# Patient Record
Sex: MALE | Race: OTHER | Employment: UNEMPLOYED | URBAN - METROPOLITAN AREA
[De-identification: names, ages, dates, MRNs, and addresses within clinical notes are randomized per-mention and may not be internally consistent; named-entity substitution may affect disease eponyms.]

---

## 2017-12-14 ENCOUNTER — HOSPITAL ENCOUNTER (OUTPATIENT)
Dept: LAB | Age: 57
Discharge: HOME | End: 2017-12-14
Payer: OTHER MISCELLANEOUS

## 2017-12-14 LAB
CRP SERPL-MCNC: 0.4 MG/DL (ref 0–0.3)
ERYTHROCYTE [DISTWIDTH] IN BLOOD BY AUTOMATED COUNT: 13.9 % (ref 11.6–14.5)
ERYTHROCYTE [SEDIMENTATION RATE] IN BLOOD: 2 MM/HR (ref 0–20)
HCT VFR BLD AUTO: 45.1 % (ref 36–48)
HGB BLD-MCNC: 14.8 G/DL (ref 13–16)
MCH RBC QN AUTO: 29.1 PG (ref 24–34)
MCHC RBC AUTO-ENTMCNC: 32.8 G/DL (ref 31–37)
MCV RBC AUTO: 88.6 FL (ref 74–97)
PLATELET # BLD AUTO: 239 K/UL (ref 135–420)
PMV BLD AUTO: 10.9 FL (ref 9.2–11.8)
RBC # BLD AUTO: 5.09 M/UL (ref 4.7–5.5)
WBC # BLD AUTO: 5.7 K/UL (ref 4.6–13.2)

## 2017-12-14 PROCEDURE — 86140 C-REACTIVE PROTEIN: CPT | Performed by: ORTHOPAEDIC SURGERY

## 2017-12-14 PROCEDURE — 85027 COMPLETE CBC AUTOMATED: CPT | Performed by: ORTHOPAEDIC SURGERY

## 2017-12-14 PROCEDURE — 85652 RBC SED RATE AUTOMATED: CPT | Performed by: ORTHOPAEDIC SURGERY

## 2019-06-12 ENCOUNTER — HOSPITAL ENCOUNTER (OUTPATIENT)
Age: 59
Discharge: HOME | End: 2019-06-12
Attending: PHYSICIAN ASSISTANT
Payer: OTHER MISCELLANEOUS

## 2019-06-12 DIAGNOSIS — S39.012A STRAIN OF LUMBAR REGION, INITIAL ENCOUNTER: ICD-10-CM

## 2019-06-12 DIAGNOSIS — S33.5XXA SPRAIN OF LUMBAR REGION: ICD-10-CM

## 2019-06-12 PROCEDURE — 72148 MRI LUMBAR SPINE W/O DYE: CPT

## 2019-06-19 ENCOUNTER — OFFICE VISIT (OUTPATIENT)
Dept: ORTHOPEDIC SURGERY | Age: 59
End: 2019-06-19

## 2020-01-08 ENCOUNTER — HOSPITAL ENCOUNTER (INPATIENT)
Age: 60
LOS: 3 days | Discharge: HOME | DRG: 378 | End: 2020-01-11
Attending: EMERGENCY MEDICINE | Admitting: INTERNAL MEDICINE
Payer: COMMERCIAL

## 2020-01-08 DIAGNOSIS — K92.1 GASTROINTESTINAL HEMORRHAGE WITH MELENA: Primary | ICD-10-CM

## 2020-01-08 PROBLEM — K92.2 GI BLEED DUE TO NSAIDS: Status: ACTIVE | Noted: 2020-01-08

## 2020-01-08 PROBLEM — T39.395A GI BLEED DUE TO NSAIDS: Status: ACTIVE | Noted: 2020-01-08

## 2020-01-08 LAB
ABO + RH BLD: NORMAL
ANION GAP SERPL CALC-SCNC: 6 MMOL/L (ref 3–18)
ATRIAL RATE: 88 BPM
BASOPHILS # BLD: 0 K/UL (ref 0–0.1)
BASOPHILS # BLD: 0 K/UL (ref 0–0.1)
BASOPHILS NFR BLD: 0 % (ref 0–2)
BASOPHILS NFR BLD: 0 % (ref 0–2)
BLOOD GROUP ANTIBODIES SERPL: NORMAL
BUN SERPL-MCNC: 18 MG/DL (ref 7–18)
BUN/CREAT SERPL: 18 (ref 12–20)
CALCIUM SERPL-MCNC: 8.8 MG/DL (ref 8.5–10.1)
CALCULATED P AXIS, ECG09: 53 DEGREES
CALCULATED R AXIS, ECG10: -29 DEGREES
CALCULATED T AXIS, ECG11: 51 DEGREES
CHLORIDE SERPL-SCNC: 105 MMOL/L (ref 100–111)
CO2 SERPL-SCNC: 28 MMOL/L (ref 21–32)
CREAT SERPL-MCNC: 0.99 MG/DL (ref 0.6–1.3)
DIAGNOSIS, 93000: NORMAL
DIFFERENTIAL METHOD BLD: ABNORMAL
DIFFERENTIAL METHOD BLD: ABNORMAL
EOSINOPHIL # BLD: 0 K/UL (ref 0–0.4)
EOSINOPHIL # BLD: 0.2 K/UL (ref 0–0.4)
EOSINOPHIL NFR BLD: 0 % (ref 0–5)
EOSINOPHIL NFR BLD: 3 % (ref 0–5)
ERYTHROCYTE [DISTWIDTH] IN BLOOD BY AUTOMATED COUNT: 13.7 % (ref 11.6–14.5)
ERYTHROCYTE [DISTWIDTH] IN BLOOD BY AUTOMATED COUNT: 13.8 % (ref 11.6–14.5)
GLUCOSE SERPL-MCNC: 138 MG/DL (ref 74–99)
HCT VFR BLD AUTO: 36.1 % (ref 36–48)
HCT VFR BLD AUTO: 38.1 % (ref 36–48)
HCT VFR BLD AUTO: 42 % (ref 36–48)
HEMOCCULT STL QL: POSITIVE
HGB BLD-MCNC: 11.2 G/DL (ref 13–16)
HGB BLD-MCNC: 12.3 G/DL (ref 13–16)
HGB BLD-MCNC: 13.4 G/DL (ref 13–16)
LYMPHOCYTES # BLD: 1.5 K/UL (ref 0.9–3.6)
LYMPHOCYTES # BLD: 2.1 K/UL (ref 0.9–3.6)
LYMPHOCYTES NFR BLD: 15 % (ref 21–52)
LYMPHOCYTES NFR BLD: 27 % (ref 21–52)
MCH RBC QN AUTO: 28.4 PG (ref 24–34)
MCH RBC QN AUTO: 29 PG (ref 24–34)
MCHC RBC AUTO-ENTMCNC: 31 G/DL (ref 31–37)
MCHC RBC AUTO-ENTMCNC: 31.9 G/DL (ref 31–37)
MCV RBC AUTO: 90.9 FL (ref 74–97)
MCV RBC AUTO: 91.4 FL (ref 74–97)
MONOCYTES # BLD: 0.6 K/UL (ref 0.05–1.2)
MONOCYTES # BLD: 0.9 K/UL (ref 0.05–1.2)
MONOCYTES NFR BLD: 7 % (ref 3–10)
MONOCYTES NFR BLD: 9 % (ref 3–10)
NEUTS SEG # BLD: 4.9 K/UL (ref 1.8–8)
NEUTS SEG # BLD: 7.4 K/UL (ref 1.8–8)
NEUTS SEG NFR BLD: 63 % (ref 40–73)
NEUTS SEG NFR BLD: 76 % (ref 40–73)
P-R INTERVAL, ECG05: 132 MS
PLATELET # BLD AUTO: 233 K/UL (ref 135–420)
PLATELET # BLD AUTO: 261 K/UL (ref 135–420)
PMV BLD AUTO: 9.2 FL (ref 9.2–11.8)
PMV BLD AUTO: 9.4 FL (ref 9.2–11.8)
POTASSIUM SERPL-SCNC: 4.1 MMOL/L (ref 3.5–5.5)
Q-T INTERVAL, ECG07: 360 MS
QRS DURATION, ECG06: 84 MS
QTC CALCULATION (BEZET), ECG08: 435 MS
RBC # BLD AUTO: 3.95 M/UL (ref 4.7–5.5)
RBC # BLD AUTO: 4.62 M/UL (ref 4.7–5.5)
SODIUM SERPL-SCNC: 139 MMOL/L (ref 136–145)
SPECIMEN EXP DATE BLD: NORMAL
TROPONIN I SERPL-MCNC: <0.02 NG/ML (ref 0–0.04)
VENTRICULAR RATE, ECG03: 88 BPM
WBC # BLD AUTO: 7.8 K/UL (ref 4.6–13.2)
WBC # BLD AUTO: 9.9 K/UL (ref 4.6–13.2)

## 2020-01-08 PROCEDURE — 99285 EMERGENCY DEPT VISIT HI MDM: CPT

## 2020-01-08 PROCEDURE — 80048 BASIC METABOLIC PNL TOTAL CA: CPT

## 2020-01-08 PROCEDURE — 86900 BLOOD TYPING SEROLOGIC ABO: CPT

## 2020-01-08 PROCEDURE — 85025 COMPLETE CBC W/AUTO DIFF WBC: CPT

## 2020-01-08 PROCEDURE — 84484 ASSAY OF TROPONIN QUANT: CPT

## 2020-01-08 PROCEDURE — 93005 ELECTROCARDIOGRAM TRACING: CPT

## 2020-01-08 PROCEDURE — 85018 HEMOGLOBIN: CPT

## 2020-01-08 PROCEDURE — 74011250636 HC RX REV CODE- 250/636: Performed by: EMERGENCY MEDICINE

## 2020-01-08 PROCEDURE — 65660000000 HC RM CCU STEPDOWN

## 2020-01-08 PROCEDURE — 82270 OCCULT BLOOD FECES: CPT

## 2020-01-08 PROCEDURE — C9113 INJ PANTOPRAZOLE SODIUM, VIA: HCPCS | Performed by: EMERGENCY MEDICINE

## 2020-01-08 PROCEDURE — 74011000250 HC RX REV CODE- 250: Performed by: EMERGENCY MEDICINE

## 2020-01-08 RX ORDER — TRAMADOL HYDROCHLORIDE 50 MG/1
50 TABLET ORAL
COMMUNITY

## 2020-01-08 RX ORDER — CYCLOBENZAPRINE HCL 10 MG
10 TABLET ORAL
COMMUNITY

## 2020-01-08 RX ORDER — IBUPROFEN 600 MG/1
600 TABLET ORAL
COMMUNITY
End: 2020-01-11

## 2020-01-08 RX ORDER — SODIUM CHLORIDE 9 MG/ML
125 INJECTION, SOLUTION INTRAVENOUS CONTINUOUS
Status: DISCONTINUED | OUTPATIENT
Start: 2020-01-08 | End: 2020-01-10

## 2020-01-08 RX ORDER — HYDROCODONE BITARTRATE AND ACETAMINOPHEN 10; 325 MG/1; MG/1
1 TABLET ORAL
COMMUNITY

## 2020-01-08 RX ADMIN — SODIUM CHLORIDE 1000 ML: 900 INJECTION, SOLUTION INTRAVENOUS at 11:03

## 2020-01-08 RX ADMIN — SODIUM CHLORIDE 125 ML/HR: 900 INJECTION, SOLUTION INTRAVENOUS at 14:58

## 2020-01-08 RX ADMIN — SODIUM CHLORIDE 1000 ML: 900 INJECTION, SOLUTION INTRAVENOUS at 14:31

## 2020-01-08 RX ADMIN — PANTOPRAZOLE SODIUM 40 MG: 40 INJECTION, POWDER, FOR SOLUTION INTRAVENOUS at 14:54

## 2020-01-08 NOTE — ED TRIAGE NOTES
Pt presents via ems for syncopal episode while at MD's office and GI Bleed. Per EMS syncopal episode lasted less than 30 seconds, pt had large stool incontinence episode at time of syncope that looked to have blood in stool per EMS. Pt denies chest pain, shortness of breath, nausea, abdominal pain. Reports mild lightheadedness. Pt has bandaging on right ankle from previous toe to ankle replacement surgery, surgical site closed and in appropriate condition for healing.

## 2020-01-08 NOTE — ED PROVIDER NOTES
HPI patient presents to the emergency room today after syncopal episode and a bloody bowel movement. He was at the orthopedic clinic next-door to our facility for routine follow-up of ankle surgery. Patient says he felt fine this morning and he felt fine as he was coming in for his appointment. While he was in the orthopedic office he said he stood up and started to feel lightheaded and dizzy. Patient was observed to have a syncopal episode and was helped down to the floor. Once he was down on the floor patient had a large bloody bowel movement. Patient says that he noticed some blood in his bowel movement earlier this morning but not did not think any more about it. Patient woke up after he was on the floor and was transported to the emergency room. On arrival to the ER he is awake alert and oriented with stable vital signs. He denies any abdominal pain nausea or vomiting. He states he has been taking oral ibuprofen regularly for several weeks to treat his ankle pain. He denies any past history of GI bleeding. No other complaints given at this time. Past Medical History:   Diagnosis Date    GERD (gastroesophageal reflux disease)        Past Surgical History:   Procedure Laterality Date    HX ORTHOPAEDIC  11/07/2019    toe to ankle replacement         History reviewed. No pertinent family history.     Social History     Socioeconomic History    Marital status: UNKNOWN     Spouse name: Not on file    Number of children: Not on file    Years of education: Not on file    Highest education level: Not on file   Occupational History    Not on file   Social Needs    Financial resource strain: Not on file    Food insecurity:     Worry: Not on file     Inability: Not on file    Transportation needs:     Medical: Not on file     Non-medical: Not on file   Tobacco Use    Smoking status: Never Smoker   Substance and Sexual Activity    Alcohol use: No    Drug use: No    Sexual activity: Not Currently Lifestyle    Physical activity:     Days per week: Not on file     Minutes per session: Not on file    Stress: Not on file   Relationships    Social connections:     Talks on phone: Not on file     Gets together: Not on file     Attends Episcopalian service: Not on file     Active member of club or organization: Not on file     Attends meetings of clubs or organizations: Not on file     Relationship status: Not on file    Intimate partner violence:     Fear of current or ex partner: Not on file     Emotionally abused: Not on file     Physically abused: Not on file     Forced sexual activity: Not on file   Other Topics Concern    Not on file   Social History Narrative    Not on file         ALLERGIES: Iodine    Review of Systems   Constitutional: Negative. HENT: Negative. Eyes: Negative. Respiratory: Negative. Cardiovascular: Negative. Gastrointestinal: Positive for blood in stool. Genitourinary: Negative. Musculoskeletal: Negative. Neurological: Negative. Psychiatric/Behavioral: Negative. Vitals:    01/08/20 1032   BP: 128/81   Pulse: 89   Resp: 18   Temp: 97.3 °F (36.3 °C)   SpO2: 99%   Weight: 96.6 kg (213 lb)   Height: 5' 7\" (1.702 m)            Physical Exam  Vitals signs and nursing note reviewed. Constitutional:       Appearance: He is well-developed. HENT:      Head: Normocephalic and atraumatic. Eyes:      Pupils: Pupils are equal, round, and reactive to light. Neck:      Musculoskeletal: Neck supple. Cardiovascular:      Rate and Rhythm: Normal rate and regular rhythm. Pulmonary:      Effort: Pulmonary effort is normal.      Breath sounds: Normal breath sounds. Abdominal:      Palpations: Abdomen is soft. Genitourinary:     Rectum: Guaiac result positive. Comments: Rectal exam shows normal rectal tone with no hemorrhoids. There is is there is bright red blood residue in the rectal vault. No active bleeding at this time.   Musculoskeletal: Normal range of motion. Skin:     General: Skin is warm and dry. Neurological:      Mental Status: He is alert and oriented to person, place, and time. MDM  Number of Diagnoses or Management Options  Gastrointestinal hemorrhage with melena:          Procedures        On patient initial presentation to the emergency department he was alert and oriented with stable vital signs. His ER work-up is documented above. Patient was observed in the emergency room for several hours after his initial presentation. He remained asymptomatic with stable vital signs for approximately 3 hours. At about 3-1/2-hour paolo. Patient had a another episode of a bloody bowel movement with hypotension and near syncope. This was treated with normal saline bolus and patient promptly returned to stable vital signs and normal mental status. At this point I discussed with the patient and his wife that I felt the best course of action with hospital admission for further evaluation and treatment. They understand and agree with this plan and we will move forward accordingly.   Tyler Butt MD 2:44 PM

## 2020-01-08 NOTE — ED NOTES
Patient's wife to nurses station, states patient needs to use restroom. When entered room, patient stated he needed to \"sit on the stool\" when asked if he felt ok walking, pt stated \"I think I need a wheelchair\". When arrived back to room with chair, pt sitting up on stretcher leaning over on wife and eyes rolling back. Assisted back to bed and Dr. Gui Vasquez called to room. Pt placed on bed pan.

## 2020-01-09 PROBLEM — M19.079 ANKLE ARTHRITIS: Status: ACTIVE | Noted: 2019-11-06

## 2020-01-09 LAB
BASOPHILS # BLD: 0 K/UL (ref 0–0.1)
BASOPHILS NFR BLD: 0 % (ref 0–2)
DIFFERENTIAL METHOD BLD: ABNORMAL
EOSINOPHIL # BLD: 0.2 K/UL (ref 0–0.4)
EOSINOPHIL NFR BLD: 2 % (ref 0–5)
ERYTHROCYTE [DISTWIDTH] IN BLOOD BY AUTOMATED COUNT: 13.9 % (ref 11.6–14.5)
HCT VFR BLD AUTO: 32.1 % (ref 36–48)
HCT VFR BLD AUTO: 35.6 % (ref 36–48)
HCT VFR BLD AUTO: 35.7 % (ref 36–48)
HGB BLD-MCNC: 10.5 G/DL (ref 13–16)
HGB BLD-MCNC: 11.2 G/DL (ref 13–16)
HGB BLD-MCNC: 11.4 G/DL (ref 13–16)
LYMPHOCYTES # BLD: 1.9 K/UL (ref 0.9–3.6)
LYMPHOCYTES NFR BLD: 24 % (ref 21–52)
MCH RBC QN AUTO: 28.9 PG (ref 24–34)
MCHC RBC AUTO-ENTMCNC: 31.5 G/DL (ref 31–37)
MCV RBC AUTO: 91.8 FL (ref 74–97)
MONOCYTES # BLD: 0.7 K/UL (ref 0.05–1.2)
MONOCYTES NFR BLD: 9 % (ref 3–10)
NEUTS SEG # BLD: 5 K/UL (ref 1.8–8)
NEUTS SEG NFR BLD: 65 % (ref 40–73)
PLATELET # BLD AUTO: 229 K/UL (ref 135–420)
PMV BLD AUTO: 9.8 FL (ref 9.2–11.8)
RBC # BLD AUTO: 3.88 M/UL (ref 4.7–5.5)
WBC # BLD AUTO: 7.9 K/UL (ref 4.6–13.2)

## 2020-01-09 PROCEDURE — 74011250637 HC RX REV CODE- 250/637: Performed by: NURSE PRACTITIONER

## 2020-01-09 PROCEDURE — 85018 HEMOGLOBIN: CPT

## 2020-01-09 PROCEDURE — 74011250636 HC RX REV CODE- 250/636: Performed by: NURSE PRACTITIONER

## 2020-01-09 PROCEDURE — 85025 COMPLETE CBC W/AUTO DIFF WBC: CPT

## 2020-01-09 PROCEDURE — 65660000000 HC RM CCU STEPDOWN

## 2020-01-09 PROCEDURE — C9113 INJ PANTOPRAZOLE SODIUM, VIA: HCPCS | Performed by: NURSE PRACTITIONER

## 2020-01-09 PROCEDURE — 74011250636 HC RX REV CODE- 250/636: Performed by: EMERGENCY MEDICINE

## 2020-01-09 PROCEDURE — 36415 COLL VENOUS BLD VENIPUNCTURE: CPT

## 2020-01-09 RX ORDER — ONDANSETRON 2 MG/ML
4 INJECTION INTRAMUSCULAR; INTRAVENOUS
Status: DISCONTINUED | OUTPATIENT
Start: 2020-01-09 | End: 2020-01-11 | Stop reason: HOSPADM

## 2020-01-09 RX ORDER — PANTOPRAZOLE SODIUM 40 MG/10ML
40 INJECTION, POWDER, LYOPHILIZED, FOR SOLUTION INTRAVENOUS EVERY 12 HOURS
Status: DISCONTINUED | OUTPATIENT
Start: 2020-01-09 | End: 2020-01-10

## 2020-01-09 RX ORDER — HYDROCODONE BITARTRATE AND ACETAMINOPHEN 10; 325 MG/1; MG/1
1 TABLET ORAL
Status: DISCONTINUED | OUTPATIENT
Start: 2020-01-09 | End: 2020-01-11 | Stop reason: HOSPADM

## 2020-01-09 RX ORDER — ACETAMINOPHEN 325 MG/1
650 TABLET ORAL
Status: DISCONTINUED | OUTPATIENT
Start: 2020-01-09 | End: 2020-01-11 | Stop reason: HOSPADM

## 2020-01-09 RX ADMIN — HYDROCODONE BITARTRATE AND ACETAMINOPHEN 1 TABLET: 10; 325 TABLET ORAL at 18:34

## 2020-01-09 RX ADMIN — SODIUM CHLORIDE 125 ML/HR: 900 INJECTION, SOLUTION INTRAVENOUS at 16:23

## 2020-01-09 RX ADMIN — PANTOPRAZOLE SODIUM 40 MG: 40 INJECTION, POWDER, LYOPHILIZED, FOR SOLUTION INTRAVENOUS at 16:23

## 2020-01-09 RX ADMIN — PANTOPRAZOLE SODIUM 40 MG: 40 INJECTION, POWDER, LYOPHILIZED, FOR SOLUTION INTRAVENOUS at 20:57

## 2020-01-09 RX ADMIN — SODIUM CHLORIDE 125 ML/HR: 900 INJECTION, SOLUTION INTRAVENOUS at 13:19

## 2020-01-09 RX ADMIN — SODIUM CHLORIDE 125 ML/HR: 900 INJECTION, SOLUTION INTRAVENOUS at 00:11

## 2020-01-09 NOTE — ROUTINE PROCESS
TRANSFER - OUT REPORT:    Verbal report given to Al Grewal RN on Elieser Jack  being transferred to [address] [place] [address] for routine progression of care       Report consisted of patients Situation, Background, Assessment and   Recommendations(SBAR). Information from the following report(s) ED Summary, MAR and Cardiac Rhythm NSR was reviewed with the receiving nurse. Lines:   Peripheral IV 01/08/20 Left Antecubital (Active)   Site Assessment Clean, dry, & intact 1/8/2020 10:30 AM   Phlebitis Assessment 0 1/8/2020 10:30 AM   Infiltration Assessment 0 1/8/2020 10:30 AM   Dressing Status Clean, dry, & intact 1/8/2020 10:30 AM   Dressing Type Transparent 1/8/2020 10:30 AM       Peripheral IV 01/08/20 Right Antecubital (Active)   Site Assessment Clean, dry, & intact 1/8/2020 10:55 AM   Phlebitis Assessment 0 1/8/2020 10:55 AM   Infiltration Assessment 0 1/8/2020 10:55 AM   Dressing Status Clean, dry, & intact 1/8/2020 10:55 AM   Dressing Type Tape;Transparent 1/8/2020 10:55 AM   Hub Color/Line Status Pink;Flushed 1/8/2020 10:55 AM   Action Taken Blood drawn 1/8/2020 10:55 AM        Opportunity for questions and clarification was provided.       Patient transported with:   Monitor

## 2020-01-09 NOTE — ROUTINE PROCESS
TRANSFER - IN REPORT:    Verbal report received from Samaria Crook on [facility]  being received from  [facility] for routine progression of care      Report consisted of patients Situation, Background, Assessment and   Recommendations(SBAR). Information from the following report(s) SBAR, Kardex, ED Summary, Procedure Summary, Intake/Output, MAR, Recent Results and Med Rec Status was reviewed with the receiving nurse. Opportunity for questions and clarification was provided. Assessment completed upon patients arrival to unit and care assumed.      Primary Nurse Lory Shanks RN and CHRISTO landers performed a dual skin assessment on this patient No impairment noted  Cullen score is 23            \

## 2020-01-09 NOTE — ED NOTES
[facility] to Bedside for transport to [facility] for admission.

## 2020-01-09 NOTE — PROGRESS NOTES
conducted an Spirituality Group for Tarry Bence, who is a 61 y.o.,male. Patient's Primary Language is: [place]. According to the patient's EMR Pentecostal Affiliation is: Other. The reason the Patient came to the hospital is:   Patient Active Problem List    Diagnosis Date Noted    GI bleed due to NSAIDs 01/08/2020         conducted Spirituality Group for ________________ who was one of ____participants. The  provided the following Interventions:  Continued the relationship of care and support. Listened empathically. Offered prayer and assurance of continued prayer on patients behalf. Chart reviewed. The following outcomes were achieved:  Patient expressed gratitude for 's visit.  w  Assessment:  There are no further spiritual or Mormonism issues which require Spiritual Care Services interventions at this time. Plan:  Chaplains will continue to follow and will provide pastoral care on an as needed/requested basis.  recommends bedside caregivers page  on duty if patient shows signs of acute spiritual or emotional distress.        [address] [place] [address]   [phone]

## 2020-01-09 NOTE — CONSULTS
[email or web address]  [phone]    GASTROENTEROLOGY CONSULT      Impression:   1. Hematochezia/melena: suspect upper GI source- likely ulcerative disease given his frequent use of NSAIDs. Will plan for EGD in AM  2. Acute mild anemia; baseline hgb 13; now Hgb 11.2. Suspect H/H may drift a little lower. 3. Syncopal episode; likely caused by hematochezia  4. Diverticulosis; noted on previous colonoscopy ~5 years ago; due for screening this year  5. NSAID use; daily use of ibuprofen- 600mg \"a lot\" during the day for LLE pain        Plan:     1. Can have full liquid diet tonight, NPO after MN for EGD in AM  2. EGD tomorrow- added on to endo schedule  3. Monitor H/H and transfuse per protocol  4. Continue BID PPI   5. Medical management per primary team    Chief Complaint: GI bleeding      HPI:  Joel Rodrigues is a 61 y.o. male who I am being asked to see in consultation for an opinion regarding the above. Patient reports doing well this AM but went to ortho clinic and felt dizzy and \"off\"   He states he must have tried to stand up and then passed out as he found his glasses and cell phone on the floor and people were asking if he was ok. He was noted to have had a large bloody BM in the chair he was sitting in. He was assisted to bed and then was noted to be going to the ER- he assumes he passed out again. He reports seeing a small amount of blood yesterday in a BM but was unconcerned. States the blood he noted today was dark red- almost black. He denies abdominal pain, heartburn/dysphagia, changes in appetite or BMs. Reports using ibuprofen 600mg several times daily for LLE pain. States last colonoscopy was 5 years ago; just received letter he is due for one this year. No previous GI bleeding  No family h/o colon cancer/GI malignancies.      PMH:   Past Medical History:   Diagnosis Date    Diverticulitis     GERD (gastroesophageal reflux disease)     Kidney stones        PSH:   Past Surgical History:   Procedure Laterality Date    HX ORTHOPAEDIC  11/07/2019    toe to ankle replacement       Social HX:   Social History     Socioeconomic History    Marital status: UNKNOWN     Spouse name: Not on file    Number of children: Not on file    Years of education: Not on file    Highest education level: Not on file   Occupational History    Not on file   Social Needs    Financial resource strain: Not on file    Food insecurity:     Worry: Not on file     Inability: Not on file    Transportation needs:     Medical: Not on file     Non-medical: Not on file   Tobacco Use    Smoking status: Never Smoker   Substance and Sexual Activity    Alcohol use: No    Drug use: No    Sexual activity: Not Currently   Lifestyle    Physical activity:     Days per week: Not on file     Minutes per session: Not on file    Stress: Not on file   Relationships    Social connections:     Talks on phone: Not on file     Gets together: Not on file     Attends Nondenominational service: Not on file     Active member of club or organization: Not on file     Attends meetings of clubs or organizations: Not on file     Relationship status: Not on file    Intimate partner violence:     Fear of current or ex partner: Not on file     Emotionally abused: Not on file     Physically abused: Not on file     Forced sexual activity: Not on file   Other Topics Concern    Not on file   Social History Narrative    Not on file       FHX:   History reviewed. No pertinent family history. Allergy:   Allergies   Allergen Reactions    Iodine Itching       Patient Active Problem List   Diagnosis Code    GI bleed due to NSAIDs K92.2, T39.395A       Home Medications:     Medications Prior to Admission   Medication Sig    cyclobenzaprine (FLEXERIL) 10 mg tablet Take 10 mg by mouth three (3) times daily as needed for Muscle Spasm(s).  HYDROcodone-acetaminophen (NORCO)  mg tablet Take 1 Tab by mouth every six (6) hours as needed for Pain.  traMADol (ULTRAM) 50 mg tablet Take 50 mg by mouth every six (6) hours as needed for Pain.  ibuprofen (MOTRIN) 600 mg tablet Take 600 mg by mouth every six (6) hours as needed for Pain.  esomeprazole (NEXIUM) 20 mg capsule Take 20 mg by mouth daily. Review of Systems:     Constitutional: No fevers, chills, weight loss, fatigue. Skin: No rashes, pruritis, jaundice, ulcerations, erythema. HENT: No headaches, nosebleeds, sinus pressure, rhinorrhea, sore throat. Eyes: No visual changes, blurred vision, eye pain, photophobia, jaundice. Cardiovascular: No chest pain, heart palpitations. Respiratory: No cough, SOB, wheezing, chest discomfort, orthopnea. Gastrointestinal: See HPI   Genitourinary: No dysuria, bleeding, discharge, pyuria. Musculoskeletal: No weakness, arthralgias, wasting. Endo: No sweats. Heme: No bruising, easy bleeding. Allergies: As noted. Neurological: Cranial nerves intact. Alert and oriented. Gait not assessed. Psychiatric:  No anxiety, depression, hallucinations. Visit Vitals  BP (!) 128/92   Pulse 85   Temp 98.4 °F (36.9 °C)   Resp 19   Ht 5' 7\" (1.702 m)   Wt 96.6 kg (213 lb)   SpO2 100%   BMI 33.36 kg/m²       Physical Assessment:     constitutional: appearance: well developed, well nourished, normal habitus, no deformities, in no acute distress. skin: inspection: no rashes, ulcers, icterus or other lesions; no clubbing or telangiectasias. palpation: no induration or subcutaneos nodules. eyes: inspection: normal conjunctivae and lids; no jaundice pupils: normal  ENMT: mouth: normal oral mucosa,lips and gums; good dentition. oropharynx: normal tongue, hard and soft palate; posterior pharynx without erithema, exudate or lesions. neck: thyroid: normal size, consistency and position; no masses or tenderness. respiratory: effort: normal chest excursion; no intercostal retraction or accessory muscle use.    cardiovascular: abdominal aorta: normal size and position; no bruits. palpation: PMI of normal size and position; normal rhythm; no thrill or murmurs. abdominal: abdomen: normal consistency; no tenderness or masses. hernias: no hernias appreciated. liver: normal size and consistency. spleen: not palpable. rectal: hemoccult/guaiac: not performed. musculoskeletal: digits and nails: no clubbing, cyanosis, petechiae or other inflammatory conditions. gait: not evaluated head and neck: normal range of motion; no pain, crepitation or contracture. spine/ribs/pelvis: normal range of motion; no pain, deformity or contracture. neurologic: cranial nerves: II-XII normal.   psychiatric: judgement/insight: within normal limits. memory: within normal limits for recent and remote events. mood and affect: no evidence of depression, anxiety or agitation. orientation: oriented to time, space and person. Basic Metabolic Profile   Recent Labs     01/08/20  1055      K 4.1      CO2 28   BUN 18   *   CA 8.8         CBC w/Diff    Recent Labs     01/09/20  0555   WBC 7.9   RBC 3.88*   HGB 11.2*   HCT 35.6*   MCV 91.8   MCH 28.9   MCHC 31.5   RDW 13.9       Recent Labs     01/09/20  0555   GRANS 65   LYMPH 24   EOS 2        Hepatic Function   No results for input(s): ALB, TP, TBILI, GPT, SGOT, AP, AML, LPSE in the last 72 hours. No lab exists for component: DBILI     Coags   No results for input(s): PTP, INR, APTT, INREXT in the last 72 hours. Terry Gonzalez NP. [facility] [address]  Cell: [phone]  [email or web address]

## 2020-01-09 NOTE — H&P
Hospitalist Admission History and Physical    NAME:  Lena Ford   :   1960   MRN:   [id number]     PCP:  Argenis Bronson MD  Date/Time:  2020 2:48 PM    Subjective:   CHIEF COMPLAINT:  GI bleeding     HISTORY OF PRESENT ILLNESS:     Royal Norton is a 61 y.o.  male who presents to [facility] via [address] ED from his orthopedic office after having 2 episodes of syncope and episode of dark red blood per rectum at ortho office. Patient also reports one additional episode of dark red blood yesterday morning prior to going to his orthopedic appointment and did have bloody BM earlier today while at [address]. Patient reports he has been taking 2 to 3 tablets of 600 mg ibuprofen for the past 2 to 3 years since sustaining work injury and resultant back / right leg pain. Patient also had right ankle arthroplasty in 2019 for which she was on adult aspirin post surgery but not on actively. Patient denies any other symptoms including no abdominal pain, nausea / vomiting, no shortness of breath, cough, chest pain. Patient denies any current dizziness or episodes of lightheadedness/syncope prior to yesterday. No new pain complaints since the syncopal episodes. Patient does endorse history of small volume bright red blood per rectum in the past. Patient does report history of GERD, past issues with diverticulitis and states he had a colonoscopy approximately 5 years ago    Past Medical History:   Diagnosis Date    Diverticulitis     GERD (gastroesophageal reflux disease)     Kidney stones         Past Surgical History:   Procedure Laterality Date    HX ORTHOPAEDIC  2019    toe to ankle replacement       Social History     Tobacco Use    Smoking status: Never Smoker   Substance Use Topics    Alcohol use: No        History reviewed. No pertinent family history.      Allergies   Allergen Reactions    Iodine Itching        Prior to Admission Medications   Prescriptions Last Dose Informant Patient Reported? Taking? HYDROcodone-acetaminophen (NORCO)  mg tablet 2020 at Unknown time  Yes Yes   Sig: Take 1 Tab by mouth every six (6) hours as needed for Pain. cyclobenzaprine (FLEXERIL) 10 mg tablet 2020 at Unknown time  Yes Yes   Sig: Take 10 mg by mouth three (3) times daily as needed for Muscle Spasm(s). esomeprazole (NEXIUM) 20 mg capsule 2020 at Unknown time  Yes Yes   Sig: Take 20 mg by mouth daily. ibuprofen (MOTRIN) 600 mg tablet 2020 at Unknown time  Yes Yes   Sig: Take 600 mg by mouth every six (6) hours as needed for Pain.   traMADol (ULTRAM) 50 mg tablet 2020 at Unknown time  Yes Yes   Sig: Take 50 mg by mouth every six (6) hours as needed for Pain. Facility-Administered Medications: None       REVIEW OF SYSTEMS:     [] Unable to obtain  ROS due to  []mental status change  []sedated   []intubated   [x]Total of 12 systems reviewed as follows:    GENERAL: hungry, no other complaints  HEENT: no sinus congestion / hearing or vision changes  NECK: No pain or stiffness. PULMONARY: no shortness of breath or cough  Cardiovascular: denies palpitations; no dizziness  GASTROINTESTINAL: Denies abdominal pain, constipation, diarrhea, nausea, vomiting; + dark red blood per rectum earlier today  GENITOURINARY: No urinary frequency, urgency, hesitancy or dysuria. MUSCULOSKELETAL: Chronic back, right knee and right ankle pain  DERMATOLOGIC: denies pruritis, rashes or open areas   ENDOCRINE: No polyuria, polydipsia, no heat or cold intolerance. HEMATOLOGICAL: No anemia or easy bruising or bleeding.    NEUROLOGIC:  no disorientation or tremor    Objective:   VITALS:    Visit Vitals  BP (!) 128/92   Pulse 85   Temp 98.4 °F (36.9 °C)   Resp 19   Ht 5' 7\" (1.702 m)   Wt 96.6 kg (213 lb)   SpO2 100%   BMI 33.36 kg/m²     Temp (24hrs), Av.5 °F (36.9 °C), Min:98.4 °F (36.9 °C), Max:98.6 °F (37 °C)      PHYSICAL EXAM:   General:          Alert, in NAD  HEENT: Pupils equal.  Sclera anicteric. Conjunctiva pink. Mucous membranes moist  Neck:               Supple. Trachea midline. No accessory muscle use. CV:                  Regular rate and rhythm. S1S2+  Lungs:             Clear to auscultation bilaterally. No Wheezing or Rhonchi. No rales. Abdomen:        Soft, non-tender. Not distended. Bowel sounds normal.   Extremities:     No cyanosis. 1-2+ right ankle swelling and tenderness at site of prior surgical intervention edema  Neurologic:      Alert and oriented X 4. Follows commands, responds appropriately. No focal neurological deficit was noted  Skin:                Warm and dry. No rashes. LAB DATA REVIEWED:    No components found for: [place]  Recent Labs     01/09/20  0555 01/08/20  1945 01/08/20  1430 01/08/20  1055   NA  --   --   --  139   K  --   --   --  4.1   CL  --   --   --  105   CO2  --   --   --  28   BUN  --   --   --  18   CREA  --   --   --  0.99   GLU  --   --   --  138*   CA  --   --   --  8.8   WBC 7.9  --  9.9 7.8   HGB 11.2* 12.3* 11.2* 13.4   HCT 35.6* 38.1 36.1 42.0     --  233 261     Assessment/Plan:      Active Problems:    GI bleed due to NSAIDs (1/8/2020)     ___________________________________________________  PLAN:    1. GI bleed - GI following, NPO after MN in anticipation of GI intervention; cont PPI; follow H/H  2. Syncopal episodes - likely in setting of GI losses; cardiac monitoring, check H/H; cont IVF; check Echo  3. OA right ankle - s/p right total ankle arthroplasty at [address] in 11/07/2019; pain control, discussed avoidance of NSAIDs   4. GERD - cont PPI  5. H/o diverticulitis  6.  Pure hyperlipidemia - dietary changes recommended per last PCP visit; consideration for statin per PCP at time of follow up if remains elevated    Risk of deterioration:  [x]Low    []Moderate  []High    Prophylaxis:  []Lovenox  []Coumadin  [x]Hep SQ  []SCDs  []H2B/PPI    Disposition:  [x]Home w/ Family   []HH PT,OT,RN []SNF/LTC   []SAH/Rehab    Discussed Code Status:    [x]Full Code      []DNR     ___________________________________________________    Care Plan discussed with:    [x]Patient   [x]Family    []ED Care Manager  []ED Doc   [x]Specialist : Gastroenterology ___________________________________________________  Admitting Provider: Gale Cano NP

## 2020-01-10 ENCOUNTER — ANESTHESIA EVENT (OUTPATIENT)
Dept: ENDOSCOPY | Age: 60
DRG: 378 | End: 2020-01-10
Payer: COMMERCIAL

## 2020-01-10 ENCOUNTER — ANESTHESIA (OUTPATIENT)
Dept: ENDOSCOPY | Age: 60
DRG: 378 | End: 2020-01-10
Payer: COMMERCIAL

## 2020-01-10 ENCOUNTER — APPOINTMENT (OUTPATIENT)
Dept: NON INVASIVE DIAGNOSTICS | Age: 60
DRG: 378 | End: 2020-01-10
Attending: NURSE PRACTITIONER
Payer: COMMERCIAL

## 2020-01-10 PROBLEM — K92.2 GI BLEED: Status: ACTIVE | Noted: 2020-01-10

## 2020-01-10 LAB
ECHO AO ROOT DIAM: 3.52 CM
ECHO LA AREA 4C: 14.5 CM2
ECHO LA VOL 2C: 57.23 ML (ref 18–58)
ECHO LA VOL 4C: 30.61 ML (ref 18–58)
ECHO LA VOL BP: 46.09 ML (ref 18–58)
ECHO LA VOL/BSA BIPLANE: 22.19 ML/M2 (ref 16–28)
ECHO LA VOLUME INDEX A2C: 27.56 ML/M2 (ref 16–28)
ECHO LA VOLUME INDEX A4C: 14.74 ML/M2 (ref 16–28)
ECHO LV EDV TEICHHOLZ: 0.5 ML
ECHO LV ESV TEICHHOLZ: 0.17 ML
ECHO LV INTERNAL DIMENSION DIASTOLIC: 4.33 CM (ref 4.2–5.9)
ECHO LV INTERNAL DIMENSION SYSTOLIC: 2.77 CM
ECHO LV IVSD: 1.15 CM (ref 0.6–1)
ECHO LV MASS 2D: 219.7 G (ref 88–224)
ECHO LV MASS INDEX 2D: 105.8 G/M2 (ref 49–115)
ECHO LV POSTERIOR WALL DIASTOLIC: 1.25 CM (ref 0.6–1)
ECHO LVOT DIAM: 1.82 CM
ECHO LVOT PEAK GRADIENT: 3.8 MMHG
ECHO LVOT PEAK VELOCITY: 97 CM/S
ECHO LVOT VTI: 19.08 CM
ECHO MV A VELOCITY: 82.36 CM/S
ECHO MV E DECELERATION TIME (DT): 212.4 MS
ECHO MV E VELOCITY: 78.21 CM/S
ECHO MV E/A RATIO: 0.95
ECHO PVEIN A DURATION: 117.6 MS
ECHO PVEIN A VELOCITY: 25.02 CM/S
ECHO RV INTERNAL DIMENSION: 3.62 CM
HCT VFR BLD AUTO: 35.3 % (ref 36–48)
HGB BLD-MCNC: 11.3 G/DL (ref 13–16)
LVFS 2D: 36.15 %
LVOT MG: 2.34 MMHG
LVOT MV: 0.73 CM/S
LVSV (TEICH): 26.17 ML
MV DEC SLOPE: 3.68

## 2020-01-10 PROCEDURE — 76040000019: Performed by: INTERNAL MEDICINE

## 2020-01-10 PROCEDURE — 77030008565 HC TBNG SUC IRR ERBE -B: Performed by: INTERNAL MEDICINE

## 2020-01-10 PROCEDURE — 74011250636 HC RX REV CODE- 250/636: Performed by: FAMILY MEDICINE

## 2020-01-10 PROCEDURE — C9113 INJ PANTOPRAZOLE SODIUM, VIA: HCPCS | Performed by: FAMILY MEDICINE

## 2020-01-10 PROCEDURE — 74011250636 HC RX REV CODE- 250/636: Performed by: NURSE PRACTITIONER

## 2020-01-10 PROCEDURE — 85018 HEMOGLOBIN: CPT

## 2020-01-10 PROCEDURE — 74011000250 HC RX REV CODE- 250: Performed by: FAMILY MEDICINE

## 2020-01-10 PROCEDURE — 93306 TTE W/DOPPLER COMPLETE: CPT

## 2020-01-10 PROCEDURE — 36415 COLL VENOUS BLD VENIPUNCTURE: CPT

## 2020-01-10 PROCEDURE — 74011250637 HC RX REV CODE- 250/637: Performed by: NURSE PRACTITIONER

## 2020-01-10 PROCEDURE — 0DJ08ZZ INSPECTION OF UPPER INTESTINAL TRACT, VIA NATURAL OR ARTIFICIAL OPENING ENDOSCOPIC: ICD-10-PCS | Performed by: INTERNAL MEDICINE

## 2020-01-10 PROCEDURE — C9113 INJ PANTOPRAZOLE SODIUM, VIA: HCPCS | Performed by: NURSE PRACTITIONER

## 2020-01-10 PROCEDURE — 65660000000 HC RM CCU STEPDOWN

## 2020-01-10 PROCEDURE — 74011000250 HC RX REV CODE- 250: Performed by: ANESTHESIOLOGY

## 2020-01-10 PROCEDURE — 77030018846 HC SOL IRR STRL H20 ICUM -A: Performed by: INTERNAL MEDICINE

## 2020-01-10 PROCEDURE — 76060000031 HC ANESTHESIA FIRST 0.5 HR: Performed by: INTERNAL MEDICINE

## 2020-01-10 RX ORDER — LIDOCAINE HYDROCHLORIDE 20 MG/ML
INJECTION, SOLUTION EPIDURAL; INFILTRATION; INTRACAUDAL; PERINEURAL AS NEEDED
Status: DISCONTINUED | OUTPATIENT
Start: 2020-01-10 | End: 2020-01-10 | Stop reason: HOSPADM

## 2020-01-10 RX ORDER — DEXTROSE 50 % IN WATER (D50W) INTRAVENOUS SYRINGE
25-50 AS NEEDED
Status: DISCONTINUED | OUTPATIENT
Start: 2020-01-10 | End: 2020-01-10 | Stop reason: HOSPADM

## 2020-01-10 RX ORDER — INSULIN LISPRO 100 [IU]/ML
INJECTION, SOLUTION INTRAVENOUS; SUBCUTANEOUS ONCE
Status: DISCONTINUED | OUTPATIENT
Start: 2020-01-10 | End: 2020-01-10 | Stop reason: HOSPADM

## 2020-01-10 RX ORDER — MAGNESIUM SULFATE 100 %
4 CRYSTALS MISCELLANEOUS AS NEEDED
Status: DISCONTINUED | OUTPATIENT
Start: 2020-01-10 | End: 2020-01-10 | Stop reason: HOSPADM

## 2020-01-10 RX ORDER — SODIUM CHLORIDE 0.9 % (FLUSH) 0.9 %
5-40 SYRINGE (ML) INJECTION EVERY 8 HOURS
Status: DISCONTINUED | OUTPATIENT
Start: 2020-01-10 | End: 2020-01-10 | Stop reason: HOSPADM

## 2020-01-10 RX ORDER — SODIUM CHLORIDE 0.9 % (FLUSH) 0.9 %
5-40 SYRINGE (ML) INJECTION AS NEEDED
Status: DISCONTINUED | OUTPATIENT
Start: 2020-01-10 | End: 2020-01-10 | Stop reason: HOSPADM

## 2020-01-10 RX ADMIN — PANTOPRAZOLE SODIUM 40 MG: 40 INJECTION, POWDER, LYOPHILIZED, FOR SOLUTION INTRAVENOUS at 08:00

## 2020-01-10 RX ADMIN — SODIUM CHLORIDE 40 MG: 9 INJECTION, SOLUTION INTRAMUSCULAR; INTRAVENOUS; SUBCUTANEOUS at 20:39

## 2020-01-10 RX ADMIN — HYDROCODONE BITARTRATE AND ACETAMINOPHEN 1 TABLET: 10; 325 TABLET ORAL at 07:40

## 2020-01-10 RX ADMIN — LIDOCAINE HYDROCHLORIDE 80 MG: 20 INJECTION, SOLUTION EPIDURAL; INFILTRATION; INTRACAUDAL; PERINEURAL at 08:30

## 2020-01-10 NOTE — PROGRESS NOTES
TRANSFER - IN REPORT:    Verbal report received from [facility]) on Kleber Miss  being received from [address] [place] [address](unit) for ordered procedure      Report consisted of patients Situation, Background, Assessment and   Recommendations(SBAR). Information from the following report(s) SBAR, Kardex, [facility] and Recent Results was reviewed with the receiving nurse. Opportunity for questions and clarification was provided. Assessment completed upon patients arrival to unit and care assumed.

## 2020-01-10 NOTE — CONSULTS
[facility] - Consult Note    Consultation request by Shanta Benjamin MD for advice/opinion related to evaluating GI bleed due to NSAIDs [K92.2, [id number]    Date of  Admission: 1/8/2020 10:30 AM   Primary Care Physician:  Yuliana Cooper MD     Assessment:     Patient Active Problem List   Diagnosis Code    GI bleed due to NSAIDs K92.2, T39.395A    Ankle arthritis M19.079    Esophageal reflux K21.9    Pure hypercholesterolemia E78.00    GI bleed K92.2       -Syncope, suspect vagal/dehydration in setting of GIB.  -GIB, bloody BM prior to admission. EGD today without source of bleeding. Plan for likely outpatient colonoscopy.  -Acute blood loss anemia with Hgb down to 10 not requiring transfusion. -H/o GERD.  -H/o diverticulitis. -H/o foot/ankle surgery. No primary cardiologist.       Plan:     Continue telemetry monitoring. Recheck orthostatics after IV hydration. Continue to follow Hgb. Will review echocardiogram once complete. History of Present Illness: This is a 61 y.o. male admitted for GI bleed due to NSAIDs [K92.2, T39.395A]. Patient complains of:  Syncope. Patient is a 61year old male without significant past cardiac history. Patient does report recent BRBPR. Patient was at doctors office he felt dizzy when standing. Patient passed out. Events were foggy. Patient was noted to have incontinence with bloody BM. Patient denies any Cp, palp, sob, orthopnea, PND, KENIA. Patient denies previous syncope. Cardiac risk factors: none. Review of Symptoms:  Except as stated above include:  Constitutional:  negative  Respiratory:  negative  Cardiovascular:  C/o syncope. Patient denies Cp, palp, SOB.   Gastrointestinal: negative  Genitourinary:  negative  Musculoskeletal:  Negative  Neurological:  Negative  Dermatological:  Negative  Endocrinological: Negative  Psychological:  Negative       Past Medical History:     Past Medical History:   Diagnosis Date    Diverticulitis     GERD (gastroesophageal reflux disease)     Kidney stones          Social History:     Social History     Socioeconomic History    Marital status: UNKNOWN     Spouse name: Not on file    Number of children: Not on file    Years of education: Not on file    Highest education level: Not on file   Tobacco Use    Smoking status: Never Smoker   Substance and Sexual Activity    Alcohol use: No    Drug use: No    Sexual activity: Not Currently        Family History:   History reviewed. No pertinent family history. Medications: Allergies   Allergen Reactions    Iodine Itching        Current Facility-Administered Medications   Medication Dose Route Frequency    ondansetron (ZOFRAN) injection 4 mg  4 mg IntraVENous Q4H PRN    acetaminophen (TYLENOL) tablet 650 mg  650 mg Oral Q4H PRN    pantoprazole (PROTONIX) injection 40 mg  40 mg IntraVENous Q12H    HYDROcodone-acetaminophen (NORCO)  mg tablet 1 Tab  1 Tab Oral Q6H PRN         Physical Exam:     Visit Vitals  /81   Pulse 91   Temp 98.6 °F (37 °C)   Resp 18   Ht 5' 7\" (1.702 m)   Wt 96.6 kg (213 lb)   SpO2 100%   BMI 33.36 kg/m²     BP Readings from Last 3 Encounters:   01/10/20 133/81   03/08/13 125/76     Pulse Readings from Last 3 Encounters:   01/10/20 91   03/08/13 87     Wt Readings from Last 3 Encounters:   01/08/20 96.6 kg (213 lb)   03/08/13 83.9 kg (185 lb)       General:  alert, cooperative, no distress, appears stated age  Neck:  no JVD  Lungs:  clear to auscultation bilaterally  Heart:  regular rate and rhythm, S1, S2 normal, no murmur, click, rub or gallop  Abdomen:  abdomen is soft without significant tenderness, masses, organomegaly or guarding  Extremities:  extremities normal, atraumatic, no cyanosis or edema  Skin: Warm and dry.  no hyperpigmentation, vitiligo, or suspicious lesions  Neuro: alert, oriented x3, affect appropriate  Psych: non focal     Data Review:     Recent Labs     01/10/20  0707 01/09/20  2233 01/09/20  1626 01/09/20  0555  01/08/20  1430 01/08/20  1055   WBC  --   --   --  7.9  --  9.9 7.8   HGB 11.3* 10.5* 11.4* 11.2*   < > 11.2* 13.4   HCT 35.3* 32.1* 35.7* 35.6*   < > 36.1 42.0   PLT  --   --   --  229  --  233 261    < > = values in this interval not displayed.      Recent Labs     01/08/20  1055      K 4.1      CO2 28   *   BUN 18   CREA 0.99   CA 8.8       Results for orders placed or performed during the hospital encounter of 01/08/20   EKG, 12 LEAD, INITIAL   Result Value Ref Range    Ventricular Rate 88 BPM    Atrial Rate 88 BPM    P-R Interval 132 ms    QRS Duration 84 ms    Q-T Interval 360 ms    QTC Calculation (Bezet) 435 ms    Calculated P Axis 53 degrees    Calculated R Axis -29 degrees    Calculated T Axis 51 degrees    Diagnosis       Normal sinus rhythm with sinus arrhythmia  Normal ECG  When compared with ECG of 08-MAR-2013 11:37,  No significant change was found  Confirmed by John Veronica ([id number]) on 1/8/2020 5:05:34 PM         All Cardiac Markers in the last 24 hours:  No results found for: CPK, CK, CKMMB, CKMB, RCK3, CKMBT, CKNDX, CKND1, DONA, TROPT, TROIQ, JACOB, TROPT, TNIPOC, BNP, BNPP    Last Lipid:  No results found for: CHOL, CHOLX, CHLST, CHOLV, HDL, HDLP, LDL, LDLC, DLDLP, TGLX, TRIGL, TRIGP, CHHD, CHHDX    Signed By: GIFTY Little     January 10, 2020

## 2020-01-10 NOTE — ANESTHESIA POSTPROCEDURE EVALUATION
Procedure(s):  ENDOSCOPY. MAC    Anesthesia Post Evaluation      Multimodal analgesia: multimodal analgesia used between 6 hours prior to anesthesia start to PACU discharge  Patient location during evaluation: bedside  Patient participation: complete - patient participated  Level of consciousness: awake  Pain score: 0  Pain management: adequate  Airway patency: patent  Anesthetic complications: no  Cardiovascular status: stable  Respiratory status: acceptable  Hydration status: acceptable  Post anesthesia nausea and vomiting:  none      Vitals Value Taken Time   /78 1/10/2020  8:52 AM   Temp 37 °C (98.6 °F) 1/10/2020  8:44 AM   Pulse 93 1/10/2020  8:53 AM   Resp 27 1/10/2020  8:53 AM   SpO2 100 % 1/10/2020  8:53 AM   Vitals shown include unvalidated device data.

## 2020-01-10 NOTE — PROCEDURES
[email or web address]  [facility], [address]     Endoscopic Gastroduodenoscopy Procedure Note    Antony White  1960  [id number]    Indication:  Melena/hematochezia     : Jeffie Phalen, MD    Referring Provider:  Connie Tarango MD    Anesthesia/Sedation:  MAC anesthesia      Procedure Details     After infomed consent was obtained for the procedure, with all risks and benefits of procedure explained the patient was taken to the endoscopy suite and placed in the left lateral decubitus position. Following sequential administration of sedation as per above, the endoscope was inserted into the mouth and advanced under direct vision to fourth portion of the duodenum. A careful inspection was made as the gastroscope was withdrawn, including a retroflexed view of the proximal stomach; findings and interventions are described below. Findings:   Esophagus:normal  Stomach: 4 cm hiatal hernia, no bleeding seen in stomach. Duodenum/jejunum: Normal to 4th portion of duodenum    Therapies:  none    Specimens: * No specimens in log *           Complications:   None; patient tolerated the procedure well. EBL:  None. Impression:    hiatal hernia 36-40 cm      Recommendations:      1. Resume clears. 2. He is due to see Dr. Ilir Cornelius soon to schedule colonoscopy. If bleeding ceases, he can be discharged and have the colonoscopy done as OP. If bleeding continues, will plan for inpatient colonoscopy.    3. Symptomatic treatment as needed if he has GERD symptoms      Jeffie Phalen, MD  1/10/2020  8:40 AM

## 2020-01-10 NOTE — H&P
History and physical has been reviewed. The patient has been examined. There have been no significant clinical changes since the completion of the originally dated History and Physical.    Addendum: All lab tests orders pertaining to the procedure have been ordered by the anesthesia personnel and results will be addressed by the anesthesia team    Domingo Rodriges MD  [facility].  [email or web address]  Phone: [phone]  Cell: [phone]  [email or web address]

## 2020-01-10 NOTE — PROGRESS NOTES
conducted an initial consultation and Spiritual Assessment for Tamar Pineda, who is a 61 y.o.,male. Patient's Primary Language is: [place]. According to the patient's EMR Jehovah's witness Affiliation is: Other. The reason the Patient came to the hospital is:   Patient Active Problem List    Diagnosis Date Noted    GI bleed due to NSAIDs 01/08/2020    Ankle arthritis 11/06/2019    Esophageal reflux 07/11/2012    Pure hypercholesterolemia 07/11/2012        The  provided the following Interventions:  Initiated a relationship of care and support. Explored issues of rian, belief, spirituality and Hindu/ritual needs while hospitalized. Listened empathically. Provided chaplaincy education. Provided information about Spiritual Care Services. Offered prayer and assurance of continued prayers on patient's behalf. Chart reviewed. The following outcomes where achieved:  Patient shared limited information about both their medical narrative and spiritual journey/beliefs.  confirmed Patient's Jehovah's witness Affiliation. Patient processed feeling about current hospitalization. Patient expressed gratitude for 's visit. Assessment:  Patient does not have any Hindu/cultural needs that will affect patient's preferences in health care. There are no spiritual or Hindu issues which require intervention at this time. Plan:  Chaplains will continue to follow and will provide pastoral care on an as needed/requested basis.  recommends bedside caregivers page  on duty if patient shows signs of acute spiritual or emotional distress.     [id number] [employer]   [phone]

## 2020-01-10 NOTE — PERIOP NOTES
TRANSFER - OUT REPORT:    Verbal report given to Jt Rosas RN(name) on Joel Rodrigues  being transferred to [address] [place] [address](unit) for routine post - op       Report consisted of patients Situation, Background, Assessment and   Recommendations(SBAR). Information from the following report(s) SBAR and Procedure Summary was reviewed with the receiving nurse. Lines:   Peripheral IV 01/08/20 Left Antecubital (Active)   Site Assessment Clean, dry, & intact 1/10/2020  7:40 AM   Phlebitis Assessment 0 1/10/2020  7:40 AM   Infiltration Assessment 0 1/10/2020  7:40 AM   Dressing Status Clean, dry, & intact 1/10/2020  7:40 AM   Dressing Type Transparent 1/10/2020  7:40 AM   Hub Color/Line Status Green;Flushed;End cap changed 1/10/2020  7:40 AM       Peripheral IV 01/08/20 Right Antecubital (Active)   Site Assessment Clean, dry, & intact 1/10/2020  7:40 AM   Phlebitis Assessment 0 1/10/2020  7:40 AM   Infiltration Assessment 0 1/10/2020  7:40 AM   Dressing Status Clean, dry, & intact 1/10/2020  7:40 AM   Dressing Type Transparent 1/10/2020  7:40 AM   Hub Color/Line Status Pink; Infusing 1/10/2020  7:40 AM   Action Taken Blood drawn 1/8/2020 10:55 AM        Opportunity for questions and clarification was provided.       Patient transported with:   V I O

## 2020-01-10 NOTE — PROGRESS NOTES
[facility] Hospitalist Group  Progress Note    Patient: Katherine Alston Age: 61 y.o. : 1960 MR#: [id number] SSN: [id number]  Date: 1/10/2020     Subjective:     + recurrent episode of dark blood per rectum earlier today approx 8 AM.  Mild dizziness with activity; no SOB, cp, n/v/abd pain    Assessment/Plan:   1.  GI bleed - GI following, cont PPI; H/H stable, follow; cont clear liquid diet for now, consideration for colonoscopy IP vs OP depending upon ? Further GI bleeding  2. Syncopal episodes - likely in setting of GI losses/vasovagal; cardiac monitoring, mild orthostastic changes noted, cont IVF for now; requested recheck of VS.  TTE; cardiology consulted  3. OA right ankle - s/p right total ankle arthroplasty [facility] in 2019; pain control, avoid NSAIDs   4. GERD - cont PPI  5. H/o diverticulitis   6.  Pure hyperlipidemia - dietary changes recommended per last PCP visit; consideration for statin per PCP at time of follow up if remains elevated    Additional Notes:      Case discussed with:  [x]Patient  []Family  [x]Nursing  []Case Management  DVT Prophylaxis:  []Lovenox  []Hep SQ  [x]SCDs  []Coumadin   []On Heparin gtt    Objective:   VS:   Visit Vitals  /81   Pulse 91   Temp 98.6 °F (37 °C)   Resp 18   Ht 5' 7\" (1.702 m)   Wt 96.6 kg (213 lb)   SpO2 100%   BMI 33.36 kg/m²      Tmax/24hrs: Temp (24hrs), Av.1 °F (36.7 °C), Min:97.6 °F (36.4 °C), Max:98.6 °F (37 °C)      Intake/Output Summary (Last 24 hours) at 1/10/2020 0933  Last data filed at 1/10/2020 0740  Gross per 24 hour   Intake 1620 ml   Output 1000 ml   Net 620 ml     General:  Alert, NAD  Cardiovascular:  RRR  Pulmonary:  LSC throughout  GI:  +BS in all four quadrants, soft, non-tender  Extremities:  No edema; 2+ dorsalis pedis pulses bilaterally  Neuro: alert and oriented x 4    Family contact: Wife Carl Alonso [phone]    Labs:    Recent Results (from the past 24 hour(s))   HGB & HCT Collection Time: 01/09/20  4:26 PM   Result Value Ref Range    HGB 11.4 (L) 13.0 - 16.0 g/dL    HCT 35.7 (L) 36.0 - 48.0 %   HGB & HCT    Collection Time: 01/09/20 10:33 PM   Result Value Ref Range    HGB 10.5 (L) 13.0 - 16.0 g/dL    HCT 32.1 (L) 36.0 - 48.0 %   HGB & HCT    Collection Time: 01/10/20  7:07 AM   Result Value Ref Range    HGB 11.3 (L) 13.0 - 16.0 g/dL    HCT 35.3 (L) 36.0 - 48.0 %       Signed By: Jaja Sharma NP     January 10, 2020

## 2020-01-10 NOTE — PROGRESS NOTES
Reason for Admission:  GI bleed due to NSAIDs [K92.2, T39.395A]                 RRAT Score:    5            Plan for utilizing home health:    No                       Likelihood of Readmission:   LOW                         Transition of Care Plan:              Initial assessment completed with patient. Cognitive status of patient: oriented to time, place, person and situation. Face sheet information confirmed:  yes. The patient designates his wife Danyell Howard  to participate in his discharge plan and to receive any needed information. This patient lives in a  home with wife. Patient is not able to navigate steps as needed. Prior to hospitalization, patient was considered to be independent with ADLs/IADLS : yes . Patient has a current ACP document on file: no  The wife will be available to transport patient home upon discharge. The patient already has [address] equipment available in the home. Patient is not currently active with home health. Patient has not stayed in a skilled nursing facility or rehab. Was  stay within last 60 days : no. This patient is on dialysis :no       Currently, the discharge plan is Home. The patient states that he can obtain his medications from the pharmacy, and take his medications as directed. Patient's current insurance is [employer]      Care Management Interventions  PCP Verified by CM: Yes(per pt he saw his pcp a couple of months ago)  Palliative Care Criteria Met (RRAT>21 & CHF Dx)?: No  Mode of Transport at Discharge:  Other (see comment)(family)  Transition of Care Consult (CM Consult): Discharge Planning  Physical Therapy Consult: No  Occupational Therapy Consult: No  Speech Therapy Consult: No  Current Support Network: Lives with Spouse  Confirm Follow Up Transport: Family  Discharge Location  Discharge Placement: Home        LISBET Turner RN  Care Management  Pager: [phone]

## 2020-01-10 NOTE — ANESTHESIA PREPROCEDURE EVALUATION
Relevant Problems   No relevant active problems       Anesthetic History               Review of Systems / Medical History      Pulmonary                   Neuro/Psych              Cardiovascular                       GI/Hepatic/Renal     GERD    Renal disease: stones       Endo/Other        Arthritis     Other Findings              Physical Exam    Airway  Mallampati: II  TM Distance: 4 - 6 cm  Neck ROM: normal range of motion   Mouth opening: Normal     Cardiovascular  Regular rate and rhythm,  S1 and S2 normal,  no murmur, click, rub, or gallop  Rhythm: regular  Rate: normal         Dental    Dentition: Lower dentition intact and Upper dentition intact     Pulmonary  Breath sounds clear to auscultation               Abdominal  GI exam deferred       Other Findings            Anesthetic Plan    ASA: 2  Anesthesia type: MAC          Induction: Intravenous  Anesthetic plan and risks discussed with: Patient

## 2020-01-11 VITALS
OXYGEN SATURATION: 96 % | SYSTOLIC BLOOD PRESSURE: 122 MMHG | BODY MASS INDEX: 33.43 KG/M2 | RESPIRATION RATE: 20 BRPM | WEIGHT: 213 LBS | HEART RATE: 89 BPM | DIASTOLIC BLOOD PRESSURE: 76 MMHG | TEMPERATURE: 98.4 F | HEIGHT: 67 IN

## 2020-01-11 LAB
ANION GAP SERPL CALC-SCNC: 4 MMOL/L (ref 3–18)
BASOPHILS # BLD: 0 K/UL (ref 0–0.1)
BASOPHILS NFR BLD: 0 % (ref 0–2)
BUN SERPL-MCNC: 13 MG/DL (ref 7–18)
BUN/CREAT SERPL: 15 (ref 12–20)
CALCIUM SERPL-MCNC: 8.8 MG/DL (ref 8.5–10.1)
CHLORIDE SERPL-SCNC: 108 MMOL/L (ref 100–111)
CO2 SERPL-SCNC: 26 MMOL/L (ref 21–32)
CREAT SERPL-MCNC: 0.89 MG/DL (ref 0.6–1.3)
DIFFERENTIAL METHOD BLD: ABNORMAL
EOSINOPHIL # BLD: 0.4 K/UL (ref 0–0.4)
EOSINOPHIL NFR BLD: 6 % (ref 0–5)
ERYTHROCYTE [DISTWIDTH] IN BLOOD BY AUTOMATED COUNT: 14 % (ref 11.6–14.5)
GLUCOSE SERPL-MCNC: 105 MG/DL (ref 74–99)
HCT VFR BLD AUTO: 33.7 % (ref 36–48)
HGB BLD-MCNC: 10.8 G/DL (ref 13–16)
LYMPHOCYTES # BLD: 1.6 K/UL (ref 0.9–3.6)
LYMPHOCYTES NFR BLD: 24 % (ref 21–52)
MCH RBC QN AUTO: 28.5 PG (ref 24–34)
MCHC RBC AUTO-ENTMCNC: 32 G/DL (ref 31–37)
MCV RBC AUTO: 88.9 FL (ref 74–97)
MONOCYTES # BLD: 0.6 K/UL (ref 0.05–1.2)
MONOCYTES NFR BLD: 9 % (ref 3–10)
NEUTS SEG # BLD: 4.1 K/UL (ref 1.8–8)
NEUTS SEG NFR BLD: 61 % (ref 40–73)
PLATELET # BLD AUTO: 225 K/UL (ref 135–420)
PMV BLD AUTO: 9.9 FL (ref 9.2–11.8)
POTASSIUM SERPL-SCNC: 3.9 MMOL/L (ref 3.5–5.5)
RBC # BLD AUTO: 3.79 M/UL (ref 4.7–5.5)
SODIUM SERPL-SCNC: 138 MMOL/L (ref 136–145)
WBC # BLD AUTO: 6.8 K/UL (ref 4.6–13.2)

## 2020-01-11 PROCEDURE — 85025 COMPLETE CBC W/AUTO DIFF WBC: CPT

## 2020-01-11 PROCEDURE — 80048 BASIC METABOLIC PNL TOTAL CA: CPT

## 2020-01-11 PROCEDURE — C9113 INJ PANTOPRAZOLE SODIUM, VIA: HCPCS | Performed by: FAMILY MEDICINE

## 2020-01-11 PROCEDURE — 74011250637 HC RX REV CODE- 250/637: Performed by: NURSE PRACTITIONER

## 2020-01-11 PROCEDURE — 74011000250 HC RX REV CODE- 250: Performed by: FAMILY MEDICINE

## 2020-01-11 PROCEDURE — 74011250636 HC RX REV CODE- 250/636: Performed by: FAMILY MEDICINE

## 2020-01-11 PROCEDURE — 36415 COLL VENOUS BLD VENIPUNCTURE: CPT

## 2020-01-11 RX ADMIN — SODIUM CHLORIDE 40 MG: 9 INJECTION, SOLUTION INTRAMUSCULAR; INTRAVENOUS; SUBCUTANEOUS at 08:07

## 2020-01-11 RX ADMIN — HYDROCODONE BITARTRATE AND ACETAMINOPHEN 1 TABLET: 10; 325 TABLET ORAL at 08:00

## 2020-01-11 NOTE — PROGRESS NOTES
Discharge planning    Discharge order noted for today. Patient and  agreeable to the transition plan today. No needs identified from CM. Transport has been arranged with family. Updated bedside RN, Dorothea Phelps, to the transition plan.      MATHEW PaintingN, RN  Pager # [phone]  Care Manager

## 2020-01-11 NOTE — DISCHARGE SUMMARY
Discharge Summary      Patient: Tamar Pineda MRN: [id number]  CSN: [id number]    YOB: 1960  Age: 61 y.o. Sex: male    DOA: 1/8/2020 LOS:  LOS: 3 days   Discharge Date: 1/11/20     Admission Diagnoses: GI bleed due to NSAIDs [K92.2, T39.395A]    Discharge Diagnoses:    1.  GI bleed   2. Symptomatic anemia   3. Syncopal episodes   4. OA right ankle - s/p right total ankle arthroplasty  5. GERD  6. Hx diverticulitis   7. HLD     Discharge Condition: Stable    PHYSICAL EXAM  Visit Vitals  /76 (BP 1 Location: Left arm, BP Patient Position: Sitting)   Pulse 89   Temp 98.4 °F (36.9 °C)   Resp 20   Ht 5' 7\" (1.702 m)   Wt 96.6 kg (213 lb)   SpO2 96%   BMI 33.36 kg/m²       General: Alert, cooperative, no acute distress    HEENT: NC, Atraumatic. EOMI. Anicteric sclerae. Lungs:  CTA Bilaterally. No Wheezing/Rhonchi/Rales. Heart:  Regular  rhythm,  No murmur, No Rubs, No Gallops  Abdomen: Soft, Non distended, Non tender. +Bowel sounds, no HSM  Extremities: No c/c/e  Psych:   Good insight. Not anxious or agitated. Neurologic:  Alert and oriented X 3. No acute neurological deficits. Hospital Course:   Mr. Fannie Singh is a 60 y/o male with a PMHx of arthritis, diverticulitis, and GERD who presented to the ED with complaints of syncope and blood per rectum. Mr. Annia Montesinos had been at his orthopedist's office for f/u for R ankle arthroplasty when he had 2 episodes of bloody bowel movements then a syncopal episode. He reported taking 2 to 3 tablets of 600 mg ibuprofen for years prior to this. Work-up included Hgb 13.4 which dropped to 11.2 after another bloody bowel movement. He was then admitted for symptomatic anemia and syncope 2/2 GI bleed. GI and cardiology were consulted. An echo was done and showed a normal EF and cardiology advised no further cardiac work-up. An EGD was done the following morning and was negative for the source of bleeding.  Mr. Annia Montesinos was kept overnight for continued monitoring. The next morning he did have another bloody bowel movement which he described as having significantly less blood than previous and improving. Serial H&H's were done and remained stable. Consideration was given to an inpatient vs outpatient colonoscopy. After a thorough discussion,  Mr. Whit Cota felt well and that his symptoms and bleeding were resolving and elected to schedule this as an outpatient. He remained hemodynamically stable and was then discharged to home. He was advised to stop taking his ibuprofen indefinitely. Consults:   Gastroenterology- Nadeen Shaw NP; Dr. Radha Anderson MD   Cardiology- [facility], [address]; Dr. Sudhakar Mederos MD     Significant Diagnostic Studies:     EGD 1/10/20:   Findings:   Esophagus:normal  Stomach: 4 cm hiatal hernia, no bleeding seen in stomach. Duodenum/jejunum: Normal to 4th portion of duodenum     Therapies:  none  Specimens: * No specimens in log *   Complications:   None; patient tolerated the procedure well. EBL:  None. Impression:    hiatal hernia 36-40 cm        Echo 1/10/20:   · Normal cavity size, systolic function (ejection fraction normal) and diastolic function. Mildly increased wall thickness. Estimated left ventricular ejection fraction is 55 - 60%. Visually measured ejection fraction. No regional wall motion abnormality noted. · Mild aortic root dilatation; diameter is 3.5 cm. Discharge Medications:  Current Discharge Medication List      CONTINUE these medications which have NOT CHANGED    Details   cyclobenzaprine (FLEXERIL) 10 mg tablet Take 10 mg by mouth three (3) times daily as needed for Muscle Spasm(s). HYDROcodone-acetaminophen (NORCO)  mg tablet Take 1 Tab by mouth every six (6) hours as needed for Pain.      traMADol (ULTRAM) 50 mg tablet Take 50 mg by mouth every six (6) hours as needed for Pain.      esomeprazole (NEXIUM) 20 mg capsule Take 20 mg by mouth daily.          STOP taking these medications ibuprofen (MOTRIN) 600 mg tablet Comments:   Reason for Stopping:                Activity: activity as tolerated    Diet: GI lite       Follow-up Information     Follow up With Specialties Details Why Contact Info    Lois David MD Internal Medicine Schedule an appointment as soon as possible for a visit in 1 week F/U  [address]  [facility] [phone] [address]      Joao Ramires MD Gastroenterology Schedule an appointment as soon as possible for a visit in 1 week F/U AND Colonoscopy [address] [facility] [address].  [phone]             Minutes spent on discharge: >30 minutes spent coordinating this discharge (review instructions/follow-up, prescriptions, preparing report for sign off)          ERVIN Root,    January 11, 2020

## 2020-01-11 NOTE — PROGRESS NOTES
[email or web address]  [phone]    Gastroenterology follow up-Progress note    Impression:  1. Hematochezia/melena: EGD negative for source of bleeding. Now suspect diverticular bleed; he has had 2-3 additional BMs with small amounts of what he describes as old blood. 2. Acute mild anemia; baseline hgb 13; now 10.8; fairly stable from previous Hgb   3. Syncopal episode; likely caused by hematochezia  4. Diverticulosis; noted on previous colonoscopy ~5 years ago; due for screening this year  5. NSAID use; daily use of ibuprofen- 600mg \"a lot\" during the day for LLE pain       Plan:  1. Advance diet as tolerated  2. If no further bleeding and H/H is stable he can be discharged home and have colonoscopy done as outpatient with Dr. Christelle Greenwood. If continues to bleed or H/H drops we will need to plan for inpatient colonoscopy. 3. Monitor H/H and transfuse per protocol  4. Medical management per primary team.     Chief Complaint: GI bleeding      Subjective:  Feels great. Reports 2 other BMs that had a small amount of \"old looking\" blood in them. No N/V. No abdominal pain. ROS: Denies any fevers, chills, rash.      Eyes: conjunctiva normal, EOM normal   Neck: ROM normal, supple and trachea normal   Cardiovascular: heart normal, intact distal pulses, normal rate and regular rhythm   Pulmonary/Chest Wall: breath sounds normal and effort normal   Abdominal: appearance normal, bowel sounds normal and soft, non-acute, non-tender     Patient Active Problem List   Diagnosis Code    GI bleed due to NSAIDs K92.2, T39.395A    Ankle arthritis M19.079    Esophageal reflux K21.9    Pure hypercholesterolemia E78.00    GI bleed K92.2         Visit Vitals  /76 (BP 1 Location: Left arm, BP Patient Position: Sitting)   Pulse 89   Temp 98.4 °F (36.9 °C)   Resp 20   Ht 5' 7\" (1.702 m)   Wt 96.6 kg (213 lb)   SpO2 96%   BMI 33.36 kg/m²           Intake/Output Summary (Last 24 hours) at 1/11/2020 9179  Last data filed at 1/10/2020 1200  Gross per 24 hour   Intake 1031.67 ml   Output 900 ml   Net 131.67 ml       CBC w/Diff    Lab Results   Component Value Date/Time    WBC 6.8 01/11/2020 05:42 AM    RBC 3.79 (L) 01/11/2020 05:42 AM    HGB 10.8 (L) 01/11/2020 05:42 AM    HCT 33.7 (L) 01/11/2020 05:42 AM    MCV 88.9 01/11/2020 05:42 AM    MCH 28.5 01/11/2020 05:42 AM    MCHC 32.0 01/11/2020 05:42 AM    RDW 14.0 01/11/2020 05:42 AM     01/11/2020 05:42 AM    Lab Results   Component Value Date/Time    GRANS 61 01/11/2020 05:42 AM    LYMPH 24 01/11/2020 05:42 AM    EOS 6 (H) 01/11/2020 05:42 AM    BASOS 0 01/11/2020 05:42 AM      Basic Metabolic Profile   Recent Labs     01/11/20  0542      K 3.9      CO2 26   BUN 13   CA 8.8        Hepatic Function    No results found for: ALB, TP, AP No results found for: SGOT, GPT, TBIL       Coags   No results for input(s): PTP, INR, APTT, INREXT in the last 72 hours. Terry Gonzalez NP    [facility]. [email or web address]  Phone: [phone]  Pager: [phone]

## 2020-01-11 NOTE — ROUTINE PROCESS
Bedside shift change report given to Adarsh Dempsey RN (oncoming nurse) by Abiola Ordonez (offgoing nurse). Report included the following information sbar, mar, kardex and recent results.

## 2020-01-11 NOTE — DISCHARGE INSTRUCTIONS
Patient Education        Gastrointestinal Bleeding: Care Instructions  Your Care Instructions    The digestive or gastrointestinal tract goes from the mouth to the anus. It is often called the GI tract. Bleeding can happen anywhere in the GI tract. It may be caused by an ulcer, an infection, or cancer. It may also be caused by medicines such as aspirin or ibuprofen. Light bleeding may not cause any symptoms at first. But if you continue to bleed for a while, you may feel very weak or tired. Sudden, heavy bleeding means you need to see a doctor right away. This kind of bleeding can be very dangerous. But it can usually be cured or controlled. The doctor may do some tests to find the cause of your bleeding. Follow-up care is a key part of your treatment and safety. Be sure to make and go to all appointments, and call your doctor if you are having problems. It's also a good idea to know your test results and keep a list of the medicines you take. How can you care for yourself at home? · Be safe with medicines. Take your medicines exactly as prescribed. Call your doctor if you think you are having a problem with your medicine. You will get more details on the specific medicines your doctor prescribes. · Do not take aspirin or other anti-inflammatory medicines, such as naproxen (Aleve) or ibuprofen (Advil, Motrin), without talking to your doctor first. Ask your doctor if it is okay to use acetaminophen (Tylenol). · Do not drink alcohol. · The bleeding may make you lose iron. So it's important to eat foods that have a lot of iron. These include red meat, shellfish, poultry, and eggs. They also include beans, raisins, whole-grain breads, and leafy green vegetables. If you want help planning meals, you can make an appointment with a dietitian. When should you call for help? Call 911 anytime you think you may need emergency care.  For example, call if:    · You have sudden, severe belly pain.     · You vomit blood or what looks like coffee grounds.     · You passed out (lost consciousness).     · Your stools are maroon or very bloody.    Call your doctor now or seek immediate medical care if:    · You are dizzy or lightheaded, or you feel like you may faint.     · Your stools are black and look like tar, or they have streaks of blood.     · You have belly pain.     · You vomit or have nausea.     · You have trouble swallowing, or it hurts when you swallow.    Watch closely for changes in your health, and be sure to contact your doctor if:    · You do not get better as expected. Where can you learn more? Go to http://joanne-fanny.info/. Enter N512 in the search box to learn more about \"Gastrointestinal Bleeding: Care Instructions. \"  Current as of: June 26, 2019  Content Version: 12.2  © 9644-3853 [employer]. Care instructions adapted under license by [employer] (which disclaims liability or warranty for this information). If you have questions about a medical condition or this instruction, always ask your healthcare professional. [place] [address] any warranty or liability for your use of this information. Patient Education        Lower Gastrointestinal Bleeding: Care Instructions  Your Care Instructions    The digestive or gastrointestinal tract goes from the mouth to the anus. It is often called the GI tract. Bleeding in the lower GI tract can happen anywhere in your small or large intestine. It can also happen in your rectum or anus. In some cases, it is caused by an infection, cancer, or inflammatory bowel disease. Or it may be caused by hemorrhoids, diverticulitis, or clotting problems. Light bleeding may not cause any symptoms at first. But if you continue to bleed for a while, you may feel very weak or tired. Sudden, heavy bleeding means you need to see a doctor right away. This kind of bleeding can be very dangerous.  But it can usually be cured or controlled. The doctor may do some tests to find the cause of your bleeding. Follow-up care is a key part of your treatment and safety. Be sure to make and go to all appointments, and call your doctor if you are having problems. It's also a good idea to know your test results and keep a list of the medicines you take. How can you care for yourself at home? · Be safe with medicines. Take your medicines exactly as prescribed. Call your doctor if you think you are having a problem with your medicine. You will get more details on the specific medicines your doctor prescribes. · Do not take aspirin or other anti-inflammatory medicines, such as naproxen (Aleve) or ibuprofen (Advil, Motrin), without talking to your doctor first. Ask your doctor if it is okay to use acetaminophen (Tylenol). · Do not drink alcohol. · The bleeding may make you lose iron. So it's important to eat foods that have a lot of iron. These include red meat, shellfish, poultry, and eggs. They also include beans, raisins, whole-grain breads, and leafy green vegetables. If you want help planning meals, you can meet with a dietitian. When should you call for help? Call 911 anytime you think you may need emergency care. For example, call if:    · You have sudden, severe belly pain.     · You vomit blood or what looks like coffee grounds.     · You passed out (lost consciousness).     · Your stools are maroon or very bloody.    Call your doctor now or seek immediate medical care if:    · You are dizzy or lightheaded, or you feel like you may faint.     · Your stools are black and look like tar, or they have streaks of blood.     · You have belly pain.     · You vomit or have nausea.    Watch closely for changes in your health, and be sure to contact your doctor if you do not get better as expected. Where can you learn more? Go to http://joanne-fanny.info/.   Enter A817 in the search box to learn more about \"Lower Gastrointestinal Bleeding: Care Instructions. \"  Current as of: June 26, 2019  Content Version: 12.2  © 0430-6421 [employer]. Care instructions adapted under license by [employer] (which disclaims liability or warranty for this information). If you have questions about a medical condition or this instruction, always ask your healthcare professional. [place] [address] any warranty or liability for your use of this information.

## 2020-01-13 NOTE — ADT AUTH CERT NOTES
Gastrointestinal Bleeding, Lower - Care Day 3 (1/10/2020) by Prieto Campos, CHRISTO         Review Status Review Entered   Completed 1/10/2020 15:20       Criteria Review      Care Day: 3 Care Date: 1/10/2020 Level of Care: Telemetry    Guideline Day 3    Clinical Status    (X) * Hemodynamic stability    1/10/2020 15:20:35 EST by Angelita Leos      98.2, 130/73, HR 99, RR 18, O2 sat 96, Ra    (X) * Stable Hgb/Hct    ( ) * Bleeding absent or minimal    (X) * Surgical and other acute interventions not needed    (X) * Pain absent or managed    ( ) * Discharge plans and education understood    Activity    (X) * Ambulatory    Routes    (X) * Oral hydration, medications, and diet    1/10/2020 15:20:35 EST by Angelita Leos      Meds: norco po, protonix iv bid,  ml.hr,    Interventions    (X) Hgb/Hct    1/10/2020 15:20:35 EST by Angelita Leos      Labs: HGb 11.3, HCT 35.2    * Milestone   Additional Notes   EGD; Findings:    Esophagus:normal   Stomach: 4 cm hiatal hernia, no bleeding seen in stomach. Duodenum/jejunum: Normal to 4th portion of duodenum   1. Resume clears. 2. He is due to see Dr. Lewis Sánchez soon to schedule colonoscopy. If bleeding ceases, he can be discharged and have the colonoscopy done as OP. If bleeding continues, will plan for inpatient colonoscopy. 3. Symptomatic treatment as needed if he has GERD symptoms      Hospitalist:   + recurrent episode of dark blood per rectum earlier today approx 8 AM.  Mild dizziness with activity; no SOB, cp, n/v/abd pain   1. GI bleed - GI following, cont PPI; H/H stable, follow; cont clear liquid diet for now, consideration for colonoscopy IP vs OP depending upon ?  Further GI bleeding   Syncopal episodes - likely in setting of GI losses/vasovagal; cardiac monitoring, mild orthostastic changes noted, cont IVF for now; requested recheck of VS.  TTE; cardiology consulted          Gastrointestinal Bleeding, Lower - Care Day 2 (1/9/2020) by Prieto Campos, RN       Review Status Review Entered   Completed 1/10/2020 15:16       Criteria Review      Care Day: 2 Care Date: 1/9/2020 Level of Care: Telemetry    Guideline Day 2    Level Of Care    (X) Floor    Clinical Status    (X) * Hypotension absent    1/10/2020 15:16:30 EST by Juni Dover      98.4, 128/92, HR 85, RR 23, 02 sat 94, Ra    (X) * Bleeding absent or reduced    Activity    (X) Activity as tolerated    Routes    (X) * Oral hydration, and tolerating diet    1/10/2020 15:16:30 EST by Juni Dover      Meds: norco po,  ml.hr, protonix iv bid    Interventions    (X) Hgb/Hct    1/10/2020 15:16:30 EST by Juni Dover      Labs: RBC 3.88, HGb 11.2 than 10.5, HCT 35.6 than 32.1,    * Milestone   Additional Notes      GI:   Plan:       1. Can have full liquid diet tonight, NPO after MN for EGD in AM   2. EGD tomorrow- added on to endo schedule   3. Monitor H/H and transfuse per protocol   4. Continue BID PPI    5. Medical management per primary team         Hopsitalist:   Assessment and plan: GI has been consulted with plans for an EGD in the morning.  We will continue with serial H&H's plan to transfuse for hemoglobin less than 7.  For now we will continue a clear liquid diet.  N.p.o. after midnight for his procedure.  Continue his home medications.

## (undated) DEVICE — AIRLIFE™ NASAL OXYGEN CANNULA CURVED, FLARED TIP WITH 14 FOOT (4.3 M) CRUSH-RESISTANT TUBING, OVER-THE-EAR STYLE: Brand: AIRLIFE™

## (undated) DEVICE — BITE BLOCK ENDOSCP UNIV AD 6 TO 9.4 MM

## (undated) DEVICE — FLUFF AND POLYMER UNDERPAD,EXTRA HEAVY: Brand: WINGS

## (undated) DEVICE — FLEX ADVANTAGE 3000CC: Brand: FLEX ADVANTAGE

## (undated) DEVICE — ENDOSCOPY PUMP TUBING/ CAP SET: Brand: ERBE

## (undated) DEVICE — SYRINGE MED 25GA 3ML L5/8IN SUBQ PLAS W/ DETACH NDL SFTY

## (undated) DEVICE — STERILE POLYISOPRENE POWDER-FREE SURGICAL GLOVES: Brand: PROTEXIS

## (undated) DEVICE — MEDI-VAC NON-CONDUCTIVE SUCTION TUBING: Brand: CARDINAL HEALTH

## (undated) DEVICE — CATHETER SUCT TR FL TIP 14FR W/ O CTRL

## (undated) DEVICE — SYR 50ML SLIP TIP NSAF LF STRL --

## (undated) DEVICE — GAUZE,SPONGE,4"X4",16PLY,STRL,LF,10/TRAY: Brand: MEDLINE

## (undated) DEVICE — MEDI-VAC SUCTION HIGH CAPACITY: Brand: CARDINAL HEALTH

## (undated) DEVICE — SOLUTION IRRIG 1000ML H2O STRL BLT

## (undated) DEVICE — BASIN EMESIS 500CC ROSE 250/CS 60/PLT: Brand: MEDEGEN MEDICAL PRODUCTS, LLC